# Patient Record
Sex: MALE | Race: BLACK OR AFRICAN AMERICAN | NOT HISPANIC OR LATINO | Employment: FULL TIME | URBAN - METROPOLITAN AREA
[De-identification: names, ages, dates, MRNs, and addresses within clinical notes are randomized per-mention and may not be internally consistent; named-entity substitution may affect disease eponyms.]

---

## 2022-04-12 PROCEDURE — 87186 SC STD MICRODIL/AGAR DIL: CPT

## 2022-04-12 PROCEDURE — 87070 CULTURE OTHR SPECIMN AEROBIC: CPT

## 2022-04-12 PROCEDURE — 87147 CULTURE TYPE IMMUNOLOGIC: CPT

## 2022-04-12 PROCEDURE — 87205 SMEAR GRAM STAIN: CPT

## 2024-12-29 ENCOUNTER — APPOINTMENT (OUTPATIENT)
Dept: GENERAL RADIOLOGY | Facility: HOSPITAL | Age: 24
End: 2024-12-29
Payer: MEDICAID

## 2024-12-29 ENCOUNTER — HOSPITAL ENCOUNTER (OUTPATIENT)
Facility: HOSPITAL | Age: 24
Discharge: HOME | End: 2024-12-29
Attending: EMERGENCY MEDICINE | Admitting: EMERGENCY MEDICINE
Payer: MEDICAID

## 2024-12-29 VITALS
SYSTOLIC BLOOD PRESSURE: 152 MMHG | DIASTOLIC BLOOD PRESSURE: 76 MMHG | HEIGHT: 72 IN | RESPIRATION RATE: 18 BRPM | HEART RATE: 71 BPM | WEIGHT: 202 LBS | TEMPERATURE: 98.5 F | BODY MASS INDEX: 27.36 KG/M2 | OXYGEN SATURATION: 100 %

## 2024-12-29 DIAGNOSIS — S83.91XA SPRAIN OF RIGHT KNEE, UNSPECIFIED LIGAMENT, INITIAL ENCOUNTER: Primary | ICD-10-CM

## 2024-12-29 PROCEDURE — 73562 X-RAY EXAM OF KNEE 3: CPT

## 2024-12-29 PROCEDURE — G0463 HOSPITAL OUTPT CLINIC VISIT: HCPCS | Performed by: NURSE PRACTITIONER

## 2024-12-29 NOTE — [facility] PROVIDER NOTE
EMERGENCY DEPARTMENT ENCOUNTER      Room Number: 04/04    History is provided by the patient, no translation services needed    HPI:      Narrative: Pt is a 24 y.o. male who presents complaining of sudden onset of right knee pain that began yesterday.  Patient states he was playing basketball when he went up for a jump, landed routinely, felt a sudden pain in the bottom of his right knee.  He states the pain was brief and appeared to have only lasted several minutes therefore he continued to play basketball.  He states that into the night he began to notice increasing pain on the lateral aspect of his right knee as well as just below the kneecap itself.  He states it is an achy stabbing sensation.  He elevated his leg last night but did not take any over-the-counter medication such as Tylenol or Advil.  He did use Tiger balm and reported the knee felt better today however he was concerned and presented for evaluation.  He denies any weakness, numbness, tingling in this extremity.  He states the pain is intermittent and exacerbated with bearing weight but overall has improved since yesterday.      PMD: Sancho Gr MD    REVIEW OF SYSTEMS  Review of Systems   Constitutional:  Negative for activity change, appetite change, chills, diaphoresis, fatigue, fever and unexpected weight change.   HENT:  Negative for congestion and facial swelling.    Respiratory:  Negative for chest tightness and shortness of breath.    Cardiovascular:  Negative for chest pain.   Genitourinary: Negative.    Musculoskeletal:  Positive for arthralgias and joint swelling. Negative for back pain, gait problem, myalgias, neck pain and neck stiffness.   Skin:  Negative for color change, pallor and rash.   Neurological:  Negative for weakness, light-headedness and headaches.   Hematological: Negative.    Psychiatric/Behavioral: Negative.           PAST MEDICAL HISTORY  Active Ambulatory Problems     Diagnosis Date Noted    No Active  Ambulatory Problems     Resolved Ambulatory Problems     Diagnosis Date Noted    No Resolved Ambulatory Problems     No Additional Past Medical History       PAST SURGICAL HISTORY  History reviewed. No pertinent surgical history.    FAMILY HISTORY  History reviewed. No pertinent family history.    SOCIAL HISTORY  Social History     Socioeconomic History    Marital status:    Tobacco Use    Smoking status: Never    Smokeless tobacco: Never   Vaping Use    Vaping status: Former   Substance and Sexual Activity    Alcohol use: Not Currently    Drug use: Defer    Sexual activity: Defer       ALLERGIES  Bee venom, Cat hair extract, and Dust mite extract    No current facility-administered medications for this encounter.  No current outpatient medications on file.    PHYSICAL EXAM  ED Triage Vitals [12/29/24 1324]   Temp Heart Rate Resp BP SpO2   98.5 °F (36.9 °C) 71 18 152/76 100 %      Temp src Heart Rate Source Patient Position BP Location FiO2 (%)   Oral Monitor Sitting Right arm --       Physical Exam  Vitals and nursing note reviewed.   Constitutional:       General: He is not in acute distress.     Appearance: Normal appearance. He is normal weight. He is not ill-appearing, toxic-appearing or diaphoretic.   HENT:      Head: Normocephalic and atraumatic.      Nose: Nose normal.      Mouth/Throat:      Mouth: Mucous membranes are moist.   Eyes:      Conjunctiva/sclera: Conjunctivae normal.   Cardiovascular:      Rate and Rhythm: Normal rate.      Pulses: Normal pulses.   Pulmonary:      Effort: Pulmonary effort is normal.   Musculoskeletal:         General: Normal range of motion.      Cervical back: Normal range of motion.      Right lower leg: Swelling and tenderness present. No deformity, lacerations or bony tenderness. No edema.      Left lower leg: No edema.        Legs:       Comments: Minimal tenderness on palpation with trace swelling.  Sensation intact, range of motion intact.  Negative Lachemann,  negative Vania.  Strength 5/5   Skin:     General: Skin is warm and dry.      Capillary Refill: Capillary refill takes less than 2 seconds.   Neurological:      General: No focal deficit present.      Mental Status: He is alert.      Motor: No weakness.   Psychiatric:         Mood and Affect: Mood normal.         Behavior: Behavior normal.         Thought Content: Thought content normal.           LAB RESULTS  Lab Results (last 24 hours)       ** No results found for the last 24 hours. **              I ordered the above labs and reviewed the results    RADIOLOGY  XR Knee 3 View Right    Result Date: 12/29/2024  XR KNEE 3 VW RIGHT Date of Exam: 12/29/2024 2:00 PM EST Indication: pain to R knee Comparison: None available. Findings: There is no evidence of fracture or dislocation. No focal lesions identified. No erosions. Joint spaces are maintained. No focal soft tissue abnormalities identified.     Impression: Unremarkable radiographs of the right knee. Electronically Signed: Elias Tesfaye,   12/29/2024 2:11 PM EST  Workstation ID: [id number]     I ordered the above radiologic testing and reviewed the results    PROCEDURES  Procedures      PROGRESS AND CONSULTS  ED Course as of 12/29/24 1451   Sun Dec 29, 2024   1449 Patient likely has a sprain as images at examination did not reveal any acute findings.  There is trace/mild swelling lateral aspect of his right knee.  Patient has been encouraged to use a knee sleeve/knee or just over-the-counter.  Knee wrapped with Ace wrap during ER visit.  Patient instructed to take Advil 600 mg 3 times daily, continue to rest see how he progresses.  If he continues to have any other pain he can return to his primary care provider for advanced imaging. [VT]      ED Course User Index  [VT] Eun Reynaga, ALIYAH           MEDICAL DECISION MAKING    MDM       My diagnosis for lower extremity pain and injury includes but is not limited to hip fracture, femur fracture, hip  dislocation, hip contusion, hip sprain, hip strain, pelvic fracture, knee sprain, patella dislocation, knee dislocation, internal derangement of knee, fractures of the femur, tibia, fibula, ankle, foot and digits, ankle sprain, ankle dislocation, Lisfranc fracture, fracture dislocations of the digits, pulmonary embolism, claudication, peripheral vascular disease, gout, osteoarthritis, rheumatoid arthritis, bursitis, septic joint,   DIAGNOSIS  Final diagnoses:   Sprain of right knee, unspecified ligament, initial encounter       Latest Documented Vital Signs:  As of 14:51 EST  BP- 152/76 HR- 71 Temp- 98.5 °F (36.9 °C) (Oral) O2 sat- 100%    DISPOSITION  home        Discussed pertinent findings with the patient/family.  Patient/Family voiced understanding of need to follow-up for recheck and further testing as needed.  Return to the Emergency Department warnings were given.         Medication List      No changes were made to your prescriptions during this visit.              Follow-up Information       Sancho Gr MD. Call in 1 week.    Specialty: Internal Medicine  Why: If symptoms worsen  Contact information:  [address] [place] [id number] [phone]                               Dictated utilizing Dragon dictation

## 2024-12-29 NOTE — DISCHARGE INSTRUCTIONS
Use an over the counter knee wrap or knee sleeve for compression and stability.   Take Advil/iburofen. 600 mg three times a day with food.   If your pain worsens, call your primary care provider as you would need advanced imaging.   Although you are being discharged from the ED today, I encourage you to return for worsening symptoms. Things can, and do, change such that treatment at home with medication may not be adequate. Specifically I recommend returning for chest pain or discomfort, difficulty breathing, persistent vomiting or difficulty holding down liquids or medications, fever > 102.0 F,  or any other worsening or alarming symptoms.      Rest. Drink plenty of fluids.  Follow up with PCP or provider listed for further evaluation and management.  Follow up with primary care provider for further management.  Take all medications as prescribed.